# Patient Record
Sex: MALE | Race: WHITE | NOT HISPANIC OR LATINO | URBAN - NONMETROPOLITAN AREA
[De-identification: names, ages, dates, MRNs, and addresses within clinical notes are randomized per-mention and may not be internally consistent; named-entity substitution may affect disease eponyms.]

---

## 2017-10-02 PROBLEM — H43.813: Noted: 2017-10-02

## 2017-10-02 PROBLEM — H02.835: Noted: 2017-10-02

## 2017-10-02 PROBLEM — H02.832: Noted: 2017-10-02

## 2017-10-02 PROBLEM — H02.834: Noted: 2017-10-02

## 2017-10-02 PROBLEM — H02.831: Noted: 2017-10-02

## 2017-10-02 PROBLEM — H40.013: Noted: 2017-10-02

## 2017-10-02 PROBLEM — Z96.1: Noted: 2017-10-02

## 2017-12-20 NOTE — PATIENT DISCUSSION
(T35.6153) Primary open-angle glaucoma right eye indeterminate stage - Assesment : Examination revealed Primary Open Angle Glaucoma. OCT Jabier Mcgregorangela 74 today. Pt reports is taking Dozolamide/Timolol bid OU, Combigan bid OU, Travatan daily OU, and Pred daily OS. Records reviewed from Dr. Petty Lopez and will scan into EHR. Pt pulse rate is 60 in office today. Pt accompanied to appt today by daughter, Sofiya Laurent. - Plan : see plan #1.

## 2017-12-20 NOTE — PATIENT DISCUSSION
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 Pt reports is taking Dozolamide/Timolol bid OU, Combigan bid OU, Travatan daily OU, and Pred daily OS. Records reviewed from Dr. Wilmer Riggs and will scan into EHR. Pt pulse rate is 60 in office today. Pt accompanied to appt today by daughter, Carly Richard. - Plan : Monitor for changes in IOP and NFL with visits and testing. Advised Pt and daughter of IOP and stressed importance of reducing IOP to protect from nerve damage and vision loss. Discussed option of MP3 Laser OS to reduce IOP. Advised of MP3 Laser R/B/A's, including risk of IOP not improving or elevating. Discussed MP3 Laser may need to be repeated if IOP not with enough improvement. All questions answered and Pt wishes to proceed. Change Combigan to Alphagan to avoid doubling up on Beta Blockers. Start Alphagan(Brimonidine) bid OU, E-Rx sent to pharmacy. Continue Dorzolamide/Timolol bid OU and Travatan qhs OU and Prednisolone qdaily OS. Schedule MP3 Laser OS. R/R to Dr eric Carson.

## 2018-02-22 NOTE — PATIENT DISCUSSION
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 s/p IOL reposition due to hyphema and dislocated IOL after a fall. Pt accompanied to appt today by son. - Plan : Advised Pt of IOP. Explained full result of laser takes about 6 weeks PO to be noted. Continue Alphagan(Brimonidine) bid OU, Dorzolamide/Timolol bid OU, and Travatan qhs OU. Increase Prednisolone to qid OS for 1 week, then bid OS for 1 week, then can continue qdaily OS. Written gtts ionstructions given to Pt today and explained gtts schedule. Call with pain, vision changes, or concerns. RTC in 1 month for IOP Check, sooner if problems.

## 2018-03-22 NOTE — PATIENT DISCUSSION
(C07.927) Keratoconjunct sicca, not specified as Sjogren's, bilateral - Assesment : Examination revealed Dry Eye Syndrome - Plan : Monitor for changes. Recommended starting PF ATs 3-4 times per day. Refresh PF [place] sample given and PF Refresh Optive sample given today. Advised not to instill ATs at the same time as Glaucoma gtts.

## 2018-03-22 NOTE — PATIENT DISCUSSION
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 IOP still elevated OS.    s/p IOL reposition due to hyphema and dislocated IOL after a fall. Pt leaving in June to go [place] for the Summer. - Plan : Advised Pt of elevated IOP OS and discussed full result of laser takes about 6 weeks PO to be noted. Discussed option of repeating MP3 Laser OS if still elevated at next appt. Continue Alphagan(Brimonidine) bid OU, Dorzolamide/Timolol bid OU, and Travatan qhs OU. RTC in 2 weeks for IOP Check, sooner if problems.

## 2018-04-05 NOTE — PATIENT DISCUSSION
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 IOP with some improvement. s/p IOL reposition due to hyphema and dislocated IOL after a fall. Pt leaving in June to go [place] for the Summer. - Plan : Monitor for IOP and NFL changes with visits and testing. Advised Pt of IOP and Pt to call if pain or vision changes. Continue Alphagan(Brimonidine) bid OU, Dorzolamide/Timolol bid OU, and Travatan qhs OU. RTC in 1 month for IOP Check, sooner if problems.

## 2018-05-10 NOTE — PATIENT DISCUSSION
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 IOP OS 27 today down from 45. Anterior chamber appears narrow today, pt is s/p PPV.   s/p IOL reposition, PPV due to hyphema and dislocated IOL after a fall. s/p DSEK OS   Pt leaving in a couple of weeks to go [facility] for the Summer. Pt does not have an appointment up [facility] until August. - Plan : Monitor for IOP and NFL changes with visits and testing. Advised Pt of importance of reducing IOP OS further. Recommended increasing drops. Increase Alphagan (Brimonidine) to tid OU and Dorzolamideto tid OU. Continue Timolol to bid OU and Travatan qhs OU.  to call and move up Pt's next appt with Dr. Heide Mcallister at [facility].

## 2019-11-27 ENCOUNTER — IMPORTED ENCOUNTER (OUTPATIENT)
Dept: URBAN - NONMETROPOLITAN AREA CLINIC 1 | Facility: CLINIC | Age: 84
End: 2019-11-27

## 2019-11-27 PROCEDURE — 92015 DETERMINE REFRACTIVE STATE: CPT

## 2019-11-27 PROCEDURE — 92014 COMPRE OPH EXAM EST PT 1/>: CPT

## 2019-11-27 NOTE — PATIENT DISCUSSION
Zoila OU - doing well. Discussed [address]. BF rx given. No change.; 's Notes: Recently lost his son to Pancreas ca. Mr María Blind is recovering from colon cancer sx.

## 2022-04-10 ASSESSMENT — PACHYMETRY
OD_CT_UM: 591; ADJ: THICK
OS_CT_UM: 613; ADJ: THICK

## 2022-04-10 ASSESSMENT — VISUAL ACUITY
OS_CC: J1
OD_CC: J1
OD_SC: 20/40
OS_SC: 20/30